# Patient Record
Sex: MALE | Race: BLACK OR AFRICAN AMERICAN
[De-identification: names, ages, dates, MRNs, and addresses within clinical notes are randomized per-mention and may not be internally consistent; named-entity substitution may affect disease eponyms.]

---

## 2018-01-17 NOTE — HP
- Patient


Scheduled date of Surgery: 01/18/18


Scheduled Surgical Procedure: Phacoemulsification and cataract extraction with 

PCIOL


Affected Eye: Left


Chief Complaint (Indication for surgery): Decreased vision affecting ADLs





- Ocular History


Other Eye History: Other (OHTN)


Eye Medications: timolol 2/2, vigamox 0/3


Previous Eye Surgery: none





- Medical History


Illnesses: None


Current Medications: 


Ambulatory Orders





NK [No Known Home Medication]  01/16/18 








Allergies/Adverse Reactions: 


 Allergies











Allergy/AdvReac Type Severity Reaction Status Date / Time


 


No Known Allergies Allergy   Verified 01/16/18 12:59














Ocular Examination





- Best Corrected Visual Acuity


Distance: Right eye: 20/30


Distance: Left eye: 20/200





- External/Slit Lamp Examination


Abnormalities: none





- Intraocular Pressure


Intraocular Pressure - Right eye: 17


Intraocular Pressure-Left eye: 17





- Lens


Lens: 2+ NS 2+ cortical  2+ diffuse PSC





- Vitreous/Retina


Vitreous/Retina: c:D 0.5 m/v wnl , hazy view of periphery





- Special Examination


M - Right eye: -1.50


M - Left eye: -2.75-1.00 x 075


K - Right eye: 44.25/45.5 x 090


K - Left eye: 43.75/44 x 013


AL - Right eye: 24.18


AL - Left eye: 23.65


IOL bag: +19.5 hoya 251


IOL sulcus: +19.0 hoya 231


IOL AC: +16.5 d [id number]





- Impression


Impression: Cataract Left Eye





- Plan


Plan: Phacoemulsification and cataract extraction - IOL Left eye


Post-hospital care will be provided in office on: 01/19/18

## 2018-01-18 ENCOUNTER — HOSPITAL ENCOUNTER (OUTPATIENT)
Dept: HOSPITAL 74 - JASU-SURG | Age: 73
Discharge: HOME | End: 2018-01-18
Attending: OPHTHALMOLOGY
Payer: COMMERCIAL

## 2018-01-18 VITALS — DIASTOLIC BLOOD PRESSURE: 75 MMHG | HEART RATE: 51 BPM | SYSTOLIC BLOOD PRESSURE: 136 MMHG

## 2018-01-18 VITALS — BODY MASS INDEX: 29 KG/M2

## 2018-01-18 VITALS — TEMPERATURE: 97.4 F

## 2018-01-18 DIAGNOSIS — H26.8: Primary | ICD-10-CM

## 2018-01-18 PROCEDURE — 08RK3JZ REPLACEMENT OF LEFT LENS WITH SYNTHETIC SUBSTITUTE, PERCUTANEOUS APPROACH: ICD-10-PCS | Performed by: OPHTHALMOLOGY

## 2018-01-18 RX ADMIN — TROPICAMIDE SCH DROP: 10 SOLUTION/ DROPS OPHTHALMIC at 07:07

## 2018-01-18 RX ADMIN — DICLOFENAC SODIUM SCH DROP: 1 SOLUTION OPHTHALMIC at 06:54

## 2018-01-18 RX ADMIN — CIPROFLOXACIN HYDROCHLORIDE SCH DROP: 3 SOLUTION/ DROPS OPHTHALMIC at 06:54

## 2018-01-18 RX ADMIN — DICLOFENAC SODIUM SCH DROP: 1 SOLUTION OPHTHALMIC at 07:06

## 2018-01-18 RX ADMIN — TROPICAMIDE SCH DROP: 10 SOLUTION/ DROPS OPHTHALMIC at 06:54

## 2018-01-18 RX ADMIN — DICLOFENAC SODIUM SCH DROP: 1 SOLUTION OPHTHALMIC at 06:45

## 2018-01-18 RX ADMIN — CIPROFLOXACIN HYDROCHLORIDE SCH DROP: 3 SOLUTION/ DROPS OPHTHALMIC at 06:45

## 2018-01-18 RX ADMIN — PHENYLEPHRINE HYDROCHLORIDE SCH DROP: 0.25 SPRAY NASAL at 07:06

## 2018-01-18 RX ADMIN — TROPICAMIDE SCH DROP: 10 SOLUTION/ DROPS OPHTHALMIC at 06:45

## 2018-01-18 RX ADMIN — PHENYLEPHRINE HYDROCHLORIDE SCH DROP: 0.25 SPRAY NASAL at 06:45

## 2018-01-18 RX ADMIN — CIPROFLOXACIN HYDROCHLORIDE SCH DROP: 3 SOLUTION/ DROPS OPHTHALMIC at 07:06

## 2018-01-18 RX ADMIN — PHENYLEPHRINE HYDROCHLORIDE SCH DROP: 0.25 SPRAY NASAL at 06:54

## 2018-01-18 NOTE — OP
Ophthalmology Operative Note


Pre-Operative Diagnosis: Cataract (Posterior subcapsular)


Affected Eye: Left


Operation: Phacoemulsification and cataract extraction with PCIOL


Findings: 





cataract, psc, nuclear and cortical


Post-Operative Diagnosis: Same as Pre-op


Assistant: None


Anesthesiologist: Karla Alvarez


Anesthesia: Topical


Specimens Removed: none


Estimated blood loss: 0


Operative Report Dictated: Yes

## 2018-01-18 NOTE — OP
DATE OF OPERATION:

 

PREOPERATIVE DIAGNOSIS:  Posterior subcapsular cataract, left eye.

 

POSTOPERATIVE DIAGNOSIS:  Combined cataract, left eye.

 

PROCEDURE:  Phacoemulsification and cataract extraction with insertion of posterior

chamber intraocular lens, left eye.

 

SURGEON:  Anna Casillas MD 

 

ASSISTANT:  None.

 

ANESTHESIA:  Topical. 

 

ANESTHESIOLOGIST:  Dr. Alvarez

 

OPERATIVE PROCEDURE:  The patient received TetraVisc eye drops and was gently sedated

and was prepped and draped in the usual sterile fashion so as to expose only the left

eye.  local anesthesia using a 50/50 mixture of lidocaine 2% and Marcaine 0.75%.  A

Van Lint lid block was delivered to the left eye using 4 mL of the mixture in a

retrobulbar injection using 4 mL of the mixture.  The patient was then prepped and

draped in the usual sterile fashion so as to expose only the left eye.  Ophthalmic

Betadine was instilled into the inferior fornix, and the lashes were taped out of the

surgical field.  An eyelid speculum was placed into the left eye.  A paracentesis was

made in inferior clear cornea at the limbus.  Then 0.5 mL of nonpreserved lidocaine

1% was injected into the anterior chamber.  Viscoelastic material was instilled into

the anterior chamber via the paracentesis.  A 2.4-mm keratome was then used to create

the main incision in temporal clear cornea at the limbus.  A continuous curvilinear

capsulorrhexis was performed using a cystotome and Utrata forceps.  Hyrodissection of

the lens cortex was performed using BSS on a cannula until the nucleus was noted to

be freely rotating.  The phacoemulsification tip was then inserted via the main wound

and used to sculpt 2 perpendicular grooves into the lens nucleus.  The nucleus was

cracked into 4 quadrants using 2 instruments.  Each quadrant was lifted out of the

capsule into the iris plane and individually phacoemulsified.  The remaining cortical

material was then aspirated using the irrigation and aspiration port.  The capsular

bag was inflated using Provisc, and a preloaded Hoya lens model 251, power +19.5

diopter was injected into the capsular bag and centered using a Sinskey hook.  The

residual viscoelastic material was removed from the anterior chamber using irrigation

and aspiration.  The wound edges were hydrated using BSS.  The wound was tested for

leakage and found to be watertight.  Therefore, Tobradex ointment was placed in the

eye, and a speculum was removed from the eye.  A sterile dressing and shield were

placed over the eye, and the patient was transferred to the recovery room in stable

condition and told to follow up in 1 day.

 

 

 

ANNA CASILLAS M.D.

 

[id number]

DD: 01/18/2018 09:46

DT: 01/18/2018 10:11

Job #:  [id number]